# Patient Record
Sex: FEMALE | Race: WHITE | NOT HISPANIC OR LATINO | Employment: FULL TIME | URBAN - METROPOLITAN AREA
[De-identification: names, ages, dates, MRNs, and addresses within clinical notes are randomized per-mention and may not be internally consistent; named-entity substitution may affect disease eponyms.]

---

## 2022-10-03 ENCOUNTER — OFFICE VISIT (OUTPATIENT)
Dept: ENDOCRINOLOGY | Facility: CLINIC | Age: 57
End: 2022-10-03

## 2022-10-03 VITALS
HEART RATE: 72 BPM | HEIGHT: 64 IN | DIASTOLIC BLOOD PRESSURE: 80 MMHG | WEIGHT: 147.3 LBS | SYSTOLIC BLOOD PRESSURE: 102 MMHG | OXYGEN SATURATION: 100 % | BODY MASS INDEX: 25.15 KG/M2

## 2022-10-03 DIAGNOSIS — E16.2 HYPOGLYCEMIA: ICD-10-CM

## 2022-10-03 DIAGNOSIS — Z79.890 POSTMENOPAUSAL HRT (HORMONE REPLACEMENT THERAPY): ICD-10-CM

## 2022-10-03 DIAGNOSIS — E55.9 VITAMIN D DEFICIENCY: ICD-10-CM

## 2022-10-03 DIAGNOSIS — E23.6 EMPTY SELLA SYNDROME: Primary | ICD-10-CM

## 2022-10-03 DIAGNOSIS — R53.82 CHRONIC FATIGUE: ICD-10-CM

## 2022-10-03 DIAGNOSIS — R63.5 WEIGHT GAIN: ICD-10-CM

## 2022-10-03 PROCEDURE — 99204 OFFICE O/P NEW MOD 45 MIN: CPT | Performed by: INTERNAL MEDICINE

## 2022-10-03 RX ORDER — PROGESTERONE 100 MG/1
100 CAPSULE ORAL DAILY
COMMUNITY
Start: 2022-07-26

## 2022-10-03 NOTE — PROGRESS NOTES
"Chief Complaint   Patient presents with   • Empty sella syndrome        Referring Provider  Rachel Cameron, *     HPI   So Viveros is a 57 y.o. female had concerns including Empty sella syndrome.      Patient referred by PCP today for empty sella syndrome.  She had an MRI without contrast completed 5/24/2022 due to left arm paresthesias noting a partially empty sella.    Pt feels \"ok\". In general, notes fatigue and weight gain.   Has gained about 15-20 lbs in the last year.     Is trying to taper off hormone patches - wonders if this is contributing to her weight gain. Is prescribed by gynecology. Her dose has been decreased.   Has been on HRT since 2013 during initial menopausal.   Is on estradiol patches and progesterone tabs at night..     Sleep is good. Feels like the progesterone helps.   Despite this she naps daily. Gets 8 hrs sleep at night.     GYN checked labs. Vitamin D is low. She hasn't been taking as it made her feel gassy - has the capsules.     Was diagnosed with \"hypoglycemia\" maybe 10 years ago. Had a syncopal episode once after she had donated blood. Other times gets shaky - BGs haven't been low when she checked with glucometer.     Diet: Is worried about about her BGs dropping so she eats more than maybe she wants to at times.   Breakfast: cereal bar, - 2 nutrigrain bars, drinks hot tea with splenda   Lunch: varies, burgers - fast food  Dinner: tacos, hamburger helper  Snacks:  chow mein noodles  Drinks: tea with splenda, diet soda       Past Medical History:   Diagnosis Date   • Hypoglycemia      History reviewed. No pertinent surgical history.   Family History   Problem Relation Age of Onset   • Thyroid disease Sister       Social History     Socioeconomic History   • Marital status: Unknown   Tobacco Use   • Smoking status: Never Smoker   • Smokeless tobacco: Never Used   Vaping Use   • Vaping Use: Never used   Substance and Sexual Activity   • Alcohol use: Never   • Drug use: Defer   • " "Sexual activity: Defer      No Known Allergies   Current Outpatient Medications on File Prior to Visit   Medication Sig Dispense Refill   • estradiol (CLIMARA) 0.1 MG/24HR patch Place 1 patch on the skin as directed by provider 1 (One) Time Per Week.     • Progesterone (PROMETRIUM) 100 MG capsule Take 100 mg by mouth Daily.       No current facility-administered medications on file prior to visit.        Review of Systems   Constitutional: Positive for fatigue and unexpected weight gain.   HENT: Negative.    Eyes: Negative.    Respiratory: Negative.    Cardiovascular: Negative.    Gastrointestinal: Negative.    Endocrine: Positive for polydipsia and polyuria.   Genitourinary: Negative.    Musculoskeletal: Negative.    Skin: Negative.    Allergic/Immunologic: Negative.    Neurological: Positive for headache.   Hematological: Negative.    Psychiatric/Behavioral: Negative.         /80   Pulse 72   Ht 162.6 cm (64\")   Wt 66.8 kg (147 lb 4.8 oz)   SpO2 100%   BMI 25.28 kg/m²      Physical Exam    Constitutional:  well developed; well nourished  no acute distress   ENT/Thyroid: no thyromegaly  no palpable nodules   Eyes: EOM intact  Conjunctiva: clear   Respiratory:  breathing is unlabored  clear to auscultation bilaterally   Cardiovascular:  regular rate and rhythm, S1, S2 normal, no murmur, click, rub or gallop   Chest:  Not performed.   Abdomen: Not performed.   : Not performed.   Musculoskeletal: negative findings:  ROM of all joints is normal, no deformities present   Skin: dry and warm   Neuro: normal without focal findings, mental status, speech normal, alert and oriented x3   Psych: oriented to time, place and person, mood and affect are within normal limits     LABS AND IMAGING    MRI brain without contrast 5/24/2022, normal brain MRI, Partially empty sella incidentally noted    5/13/22 HB 13.9, glucose 80, Cr 0.88, GFR 78, calcium 8.9, LFTs normal, , total cholesterol 204, TG 73, HDL 63, LH " 22.6, FSH 35.6, A1c 5.3, estradiol 47.6, vitamin D 18.6, TSH 1.72, B12 650    Assessment and Plan    Diagnoses and all orders for this visit:    1. Empty sella syndrome (HCC) (Primary)  Typically this is an incidental radiologic finding. The patient that has no physical signs or symptoms of hypopituitarism, in fact gonadotropins are appropriately elevated in expected range post-menopause.  However, additional labs can be checked to ensure there is no dysfunction.  If lab testing is normal, no routine imaging or follow-up is necessary for this.  -     Cortisol - AM; Future  -     ACTH; Future  -     T4, Free; Future  -     TSH; Future    2. Chronic fatigue  Screening labs as above. Consider in part due to vitamin D deficiency. Follow-up with PCP if persists and testing is normal.   -     Cortisol - AM; Future  -     ACTH; Future  -     T4, Free; Future  -     TSH; Future    3. Hypoglycemia  Pt was told she has a diagnosis of hypoglycemic episodes but doesn't recall having a true low BG. I have asked the pt to monitor BG at home when symptomatic and if BG < 55 on glucometer, additional lab evaluation during an episode when BG is < 55 is necessary. If no episodes with glucose < 55, I expect the patient may be experiencing normal (though pronounced) adrenergic symptoms related to fasting and dietary modification may be all that is needed. (less carbs, more protein).    4. Vitamin D deficiency  Advised trail of vitamin D tabs as opposed to capsules, or consider patches. Can contribute to fatigue.      5. Postmenopausal HRT   On low dose estrogen patch and progesterone for 9 years. Very symptomatic with taper. Likely ok to continue low dose - try to taper off as able. Continue follow-up with gyn.     6. Weight gain  Suspect related to diet/calorie intake and inadequate exercise. Track intake, increase protein, decrease carbs.   Lab eval as above.     Return pending lab results. The patient was instructed to contact the  clinic with any interval questions or concerns.    Anne Hamm, DO   Endocrinologist    Please note that portions of this note were completed with a voice recognition program.